# Patient Record
Sex: MALE | Race: WHITE | NOT HISPANIC OR LATINO
[De-identification: names, ages, dates, MRNs, and addresses within clinical notes are randomized per-mention and may not be internally consistent; named-entity substitution may affect disease eponyms.]

---

## 2024-07-22 ENCOUNTER — DASHBOARD ENCOUNTERS (OUTPATIENT)
Age: 64
End: 2024-07-22

## 2024-07-22 ENCOUNTER — OFFICE VISIT (OUTPATIENT)
Dept: URBAN - METROPOLITAN AREA CLINIC 128 | Facility: CLINIC | Age: 64
End: 2024-07-22
Payer: COMMERCIAL

## 2024-07-22 VITALS
SYSTOLIC BLOOD PRESSURE: 124 MMHG | HEART RATE: 50 BPM | DIASTOLIC BLOOD PRESSURE: 74 MMHG | WEIGHT: 154 LBS | HEIGHT: 68 IN | BODY MASS INDEX: 23.34 KG/M2 | TEMPERATURE: 98.1 F

## 2024-07-22 DIAGNOSIS — K63.8219 SMALL INTESTINAL BACTERIAL OVERGROWTH (SIBO): ICD-10-CM

## 2024-07-22 DIAGNOSIS — K59.04 CHRONIC IDIOPATHIC CONSTIPATION: ICD-10-CM

## 2024-07-22 DIAGNOSIS — Z86.010 HISTORY OF COLON POLYPS: ICD-10-CM

## 2024-07-22 PROBLEM — 428283002: Status: ACTIVE | Noted: 2024-07-22

## 2024-07-22 PROBLEM — 82934008: Status: ACTIVE | Noted: 2024-07-22

## 2024-07-22 PROCEDURE — 99203 OFFICE O/P NEW LOW 30 MIN: CPT | Performed by: INTERNAL MEDICINE

## 2024-07-22 RX ORDER — DICYCLOMINE HYDROCHLORIDE 10 MG/1
TAKE 1 CAPSULE (10 MG) BY ORAL ROUTE 3 TIMES PER DAY FOR 14 DAYS CAPSULE ORAL
Qty: 42 | Refills: 1 | Status: DISCONTINUED | COMMUNITY
Start: 2017-05-10

## 2024-07-22 NOTE — HPI-TODAY'S VISIT:
Pleasant 62yo gentleman here for evaluation of GI symptoms.  He has hx of chronic idiopathic constipation that he manages fairly well with trulance and high fiber diet.  He offers hx of bloating and SIBO + late last year.  Tx with Xifacan and Neomycin dramatically improved his bloating but it is recurring in the past month or two.  Weight stable and appetite is good.  No UGI symptoms.  Last colonoscopy was in 2021 with recommendation for 5 year follow up because of hx of colon adenomas.  We discussed repeat therapy with Xifaxan and Neomycin for 2 weeks to see if bloating again resolves suggesting again that it is due to SIBO.

## 2024-08-13 ENCOUNTER — OFFICE VISIT (OUTPATIENT)
Dept: URBAN - METROPOLITAN AREA CLINIC 128 | Facility: CLINIC | Age: 64
End: 2024-08-13